# Patient Record
Sex: MALE | URBAN - METROPOLITAN AREA
[De-identification: names, ages, dates, MRNs, and addresses within clinical notes are randomized per-mention and may not be internally consistent; named-entity substitution may affect disease eponyms.]

---

## 2020-02-20 ENCOUNTER — RECORDS - HEALTHEAST (OUTPATIENT)
Dept: LAB | Facility: HOSPITAL | Age: 25
End: 2020-02-20

## 2020-02-21 LAB
HBV SURFACE AB SERPL IA-ACNC: POSITIVE M[IU]/ML
VZV IGG SER QL IA: POSITIVE

## 2020-02-24 LAB
GAMMA INTERFERON BACKGROUND BLD IA-ACNC: 0.03 IU/ML
M TB IFN-G BLD-IMP: NEGATIVE
MITOGEN IGNF BCKGRD COR BLD-ACNC: 0 IU/ML
MITOGEN IGNF BCKGRD COR BLD-ACNC: 0.01 IU/ML
QTF INTERPRETATION: NORMAL
QTF MITOGEN - NIL: 5.81 IU/ML

## 2020-04-06 ENCOUNTER — NURSE TRIAGE (OUTPATIENT)
Dept: NURSING | Facility: CLINIC | Age: 25
End: 2020-04-06

## 2020-04-06 NOTE — TELEPHONE ENCOUNTER
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 I referred him to employee health. Gave him the phone number and told him to have the employee health nurse paged and she/he will call him back with instructions.     Luz Maria Russell RN on 4/6/2020 at 3:43 PM    Reason for Disposition    General information question, no triage required and triager able to answer question    Additional Information    Negative: RN needs further essential information from caller in order to complete triage    Negative: Requesting regular office appointment    Negative: [1] Caller requesting NON-URGENT health information AND [2] PCP's office is the best resource    Protocols used: INFORMATION ONLY CALL-A-

## 2020-10-22 ENCOUNTER — APPOINTMENT (OUTPATIENT)
Dept: FAMILY MEDICINE | Facility: CLINIC | Age: 25
End: 2020-10-22
Payer: COMMERCIAL

## 2020-10-22 ENCOUNTER — RESULTS ONLY (OUTPATIENT)
Dept: LAB | Age: 25
End: 2020-10-22

## 2020-10-23 LAB
SARS-COV-2 RNA SPEC QL NAA+PROBE: NOT DETECTED
SPECIMEN SOURCE: NORMAL

## 2020-11-05 ENCOUNTER — HOSPITAL ENCOUNTER (EMERGENCY)
Facility: CLINIC | Age: 25
Discharge: HOME | End: 2020-11-05
Attending: PHYSICIAN ASSISTANT | Admitting: PHYSICIAN ASSISTANT
Payer: COMMERCIAL

## 2020-11-05 ENCOUNTER — APPOINTMENT (OUTPATIENT)
Dept: CT IMAGING | Facility: CLINIC | Age: 25
End: 2020-11-05
Attending: PHYSICIAN ASSISTANT
Payer: COMMERCIAL

## 2020-11-05 VITALS
TEMPERATURE: 98.9 F | BODY MASS INDEX: 26.32 KG/M2 | HEART RATE: 77 BPM | WEIGHT: 183.86 LBS | RESPIRATION RATE: 17 BRPM | SYSTOLIC BLOOD PRESSURE: 128 MMHG | DIASTOLIC BLOOD PRESSURE: 74 MMHG | OXYGEN SATURATION: 97 % | HEIGHT: 70 IN

## 2020-11-05 DIAGNOSIS — M54.41 ACUTE BILATERAL LOW BACK PAIN WITH BILATERAL SCIATICA: ICD-10-CM

## 2020-11-05 DIAGNOSIS — M54.42 ACUTE BILATERAL LOW BACK PAIN WITH BILATERAL SCIATICA: ICD-10-CM

## 2020-11-05 DIAGNOSIS — R51.9 HEADACHE: ICD-10-CM

## 2020-11-05 PROCEDURE — 250N000011 HC RX IP 250 OP 636: Performed by: PHYSICIAN ASSISTANT

## 2020-11-05 PROCEDURE — 99285 EMERGENCY DEPT VISIT HI MDM: CPT | Mod: 25

## 2020-11-05 PROCEDURE — 258N000003 HC RX IP 258 OP 636: Performed by: PHYSICIAN ASSISTANT

## 2020-11-05 PROCEDURE — 96375 TX/PRO/DX INJ NEW DRUG ADDON: CPT

## 2020-11-05 PROCEDURE — 96374 THER/PROPH/DIAG INJ IV PUSH: CPT

## 2020-11-05 PROCEDURE — 96361 HYDRATE IV INFUSION ADD-ON: CPT

## 2020-11-05 PROCEDURE — C9803 HOPD COVID-19 SPEC COLLECT: HCPCS

## 2020-11-05 PROCEDURE — 70450 CT HEAD/BRAIN W/O DYE: CPT

## 2020-11-05 PROCEDURE — U0003 INFECTIOUS AGENT DETECTION BY NUCLEIC ACID (DNA OR RNA); SEVERE ACUTE RESPIRATORY SYNDROME CORONAVIRUS 2 (SARS-COV-2) (CORONAVIRUS DISEASE [COVID-19]), AMPLIFIED PROBE TECHNIQUE, MAKING USE OF HIGH THROUGHPUT TECHNOLOGIES AS DESCRIBED BY CMS-2020-01-R: HCPCS | Performed by: PHYSICIAN ASSISTANT

## 2020-11-05 RX ORDER — KETOROLAC TROMETHAMINE 30 MG/ML
30 INJECTION, SOLUTION INTRAMUSCULAR; INTRAVENOUS ONCE
Status: COMPLETED | OUTPATIENT
Start: 2020-11-05 | End: 2020-11-05

## 2020-11-05 RX ORDER — METOCLOPRAMIDE 10 MG/1
10 TABLET ORAL 4 TIMES DAILY PRN
Qty: 10 TABLET | Refills: 0 | Status: SHIPPED | OUTPATIENT
Start: 2020-11-05

## 2020-11-05 RX ORDER — DEXAMETHASONE SODIUM PHOSPHATE 10 MG/ML
10 INJECTION, SOLUTION INTRAMUSCULAR; INTRAVENOUS ONCE
Status: COMPLETED | OUTPATIENT
Start: 2020-11-05 | End: 2020-11-05

## 2020-11-05 RX ORDER — DIPHENHYDRAMINE HCL 50 MG
50 CAPSULE ORAL EVERY 6 HOURS PRN
Qty: 10 CAPSULE | Refills: 0 | Status: SHIPPED | OUTPATIENT
Start: 2020-11-05

## 2020-11-05 RX ORDER — SODIUM CHLORIDE 9 MG/ML
INJECTION, SOLUTION INTRAVENOUS CONTINUOUS
Status: DISCONTINUED | OUTPATIENT
Start: 2020-11-05 | End: 2020-11-05 | Stop reason: HOSPADM

## 2020-11-05 RX ORDER — DIPHENHYDRAMINE HYDROCHLORIDE 50 MG/ML
25 INJECTION INTRAMUSCULAR; INTRAVENOUS ONCE
Status: COMPLETED | OUTPATIENT
Start: 2020-11-05 | End: 2020-11-05

## 2020-11-05 RX ORDER — CYCLOBENZAPRINE HCL 10 MG
10 TABLET ORAL 3 TIMES DAILY PRN
Qty: 20 TABLET | Refills: 0 | Status: SHIPPED | OUTPATIENT
Start: 2020-11-05 | End: 2020-11-11

## 2020-11-05 RX ORDER — METOCLOPRAMIDE HYDROCHLORIDE 5 MG/ML
10 INJECTION INTRAMUSCULAR; INTRAVENOUS ONCE
Status: COMPLETED | OUTPATIENT
Start: 2020-11-05 | End: 2020-11-05

## 2020-11-05 RX ADMIN — DIPHENHYDRAMINE HYDROCHLORIDE 25 MG: 50 INJECTION, SOLUTION INTRAMUSCULAR; INTRAVENOUS at 12:22

## 2020-11-05 RX ADMIN — METOCLOPRAMIDE HYDROCHLORIDE 10 MG: 5 INJECTION INTRAMUSCULAR; INTRAVENOUS at 12:24

## 2020-11-05 RX ADMIN — DEXAMETHASONE SODIUM PHOSPHATE 10 MG: 10 INJECTION, SOLUTION INTRAMUSCULAR; INTRAVENOUS at 12:21

## 2020-11-05 RX ADMIN — KETOROLAC TROMETHAMINE 30 MG: 30 INJECTION, SOLUTION INTRAMUSCULAR at 12:23

## 2020-11-05 RX ADMIN — SODIUM CHLORIDE 1000 ML: 9 INJECTION, SOLUTION INTRAVENOUS at 12:21

## 2020-11-05 ASSESSMENT — ENCOUNTER SYMPTOMS
FEVER: 0
HEMATURIA: 0
VOMITING: 0
HEADACHES: 1
WEAKNESS: 0
COUGH: 0
BACK PAIN: 1
DIARRHEA: 0
FREQUENCY: 0
MYALGIAS: 1
SHORTNESS OF BREATH: 0
NAUSEA: 0
NUMBNESS: 0
ABDOMINAL PAIN: 0
CHILLS: 0
SPEECH DIFFICULTY: 0
DYSURIA: 0
NECK STIFFNESS: 0

## 2020-11-05 ASSESSMENT — MIFFLIN-ST. JEOR: SCORE: 1820.25

## 2020-11-05 NOTE — ED AVS SNAPSHOT
[facility] Emergency Dept  [address]  [facility] [address]  Phone: [phone]  Fax: [phone]                                    Maged Lambert   MRN: [id number]    Department: [facility] Emergency Dept   Date of Visit: 11/5/2020           After Visit Summary Signature Page    I have received my discharge instructions, and my questions have been answered. I have discussed any challenges I see with this plan with the nurse or doctor.    ..........................................................................................................................................  Patient/Patient Representative Signature      ..........................................................................................................................................  Patient Representative Print Name and Relationship to Patient    ..................................................               ................................................  Date                                   Time    ..........................................................................................................................................  Reviewed by Signature/Title    ...................................................              ..............................................  Date                                               Time          22EPIC Rev 08/18

## 2020-11-05 NOTE — ED TRIAGE NOTES
Presents with lower back pain which radiates down bilateral legs to his calfs, nausea and headache since Tuesday. A&o x 4 with VSS on RA.

## 2020-11-05 NOTE — ED PROVIDER NOTES
History     Chief Complaint:  Back Pain and Headache      HPI   Maged Lambert is a 25 year old male who presents for evaluation of back pain and a headache. On 11/3/2020 the patient experienced the gradual onset of a throbbing headache as well as new aching back pain with radiation into his bilateral calves. Since onset his pain has been progressively worsening and he has been taking Tylenol at home with limited improvement of his pain. This morning his headache and back pain worsened significantly, prompting him to seek evaluation in the ED. He has not had any recent falls or head injuries. He last took Tylenol around 1000 this morning. He reports that he has previously had similar headaches but his headache today is more severe than what he generally experiences. Otherwise, he denies any recent fever, chills, cough, shortness of breath, chest pain, abdominal pain, nausea, vomiting, diarrhea, dysuria, hematuria, urinary frequency, neck stiffness, numbness, weakness, visual disturbance, speech changes, or incontinence of bowel or bladder. 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      Allergies:  No Known Drug Allergies     Medications:    Adderall  Monodox     Past Medical History:    The patient does not have any pertinent past medical history.     Past Surgical History:    History reviewed. No pertinent surgical history.     Family History:    History reviewed. No pertinent family history.      Social History:  The patient presented to the ED alone.   Smoking Status: Never smoker  Smokeless Tobacco: Never used  Alcohol Use: Yes  Marital Status:      Review of Systems   Constitutional: Negative for chills and fever.   Eyes: Negative for visual disturbance.   Respiratory: Negative for cough and shortness of breath.    Cardiovascular: Negative for chest pain.   Gastrointestinal: Negative for abdominal pain, diarrhea, nausea and vomiting.   Genitourinary: Negative for  "dysuria, frequency and hematuria.   Musculoskeletal: Positive for back pain and myalgias (leg pain). Negative for neck stiffness.   Neurological: Positive for headaches. Negative for speech difficulty, weakness and numbness.        (-) Incontinence    All other systems reviewed and are negative.    Physical Exam     Patient Vitals for the past 24 hrs:   BP Temp Temp src Pulse Resp SpO2 Height Weight   11/05/20 1245 -- -- -- -- -- 97 % -- --   11/05/20 1230 128/74 -- -- 77 -- 98 % -- --   11/05/20 1215 -- -- -- -- -- 100 % -- --   11/05/20 1122 132/67 98.9  F (37.2  C) Temporal 83 17 98 % 1.778 m (5' 10\") 83.4 kg (183 lb 13.8 oz)       Physical Exam  Vitals signs and nursing note reviewed.   Constitutional:       General: He is not in acute distress.     Appearance: He is not diaphoretic.   HENT:      Head: Normocephalic and atraumatic.   Eyes:      General: No scleral icterus.  Neck:      Meningeal: Brudzinski's sign and Kernig's sign absent.      Comments: No tenderness, stiff, or pain on examination  Cardiovascular:      Rate and Rhythm: Normal rate and regular rhythm.      Pulses: Normal pulses.   Pulmonary:      Effort: Pulmonary effort is normal. No respiratory distress.   Musculoskeletal:         General: No tenderness.      Comments: Baseline ROM, strength, sensation at the lumbar spine, bilateral hips, knee, ankle, feet, great toe through all cardinal motion   Skin:     General: Skin is warm.      Findings: No rash.   Neurological:      General: No focal deficit present.      Mental Status: He is alert and oriented to person, place, and time. Mental status is at baseline.      Cranial Nerves: No cranial nerve deficit.      Comments: CN intact, motor intact, sensory intact, no pronator drift, no gait disturbance, no deficits finger-finger-nose or heel-shin testing       Emergency Department Course     Imaging:  Radiology findings were communicated with the patient who voiced understanding of the " findings.    Head CT w/o Contrast:  IMPRESSION: No acute intracranial abnormality.   Per radiology.     Laboratory:  Asymptomatic COVID-19 Virus by PCR: Pending      Interventions:  1221 NS 1,000 mL IV   1221 Decadron 10 mg IV   1222 Benadryl 25 mg IV   1223 Toradol 30 mg IV   1224 Reglan 10 mg IV     Emergency Department Course:  Nursing notes and vitals reviewed.  1130: I performed an exam of the patient as documented above.     1250: I updated and reassessed the patient.     Findings and plan explained to the Patient. Patient discharged home with instructions regarding supportive care, medications, and reasons to return. The importance of close follow-up was reviewed. The patient was prescribed Flexeril, Benadryl, and Reglan.      Impression & Plan     Medical Decision Making:  He presents for evaluation of both headache and lower back pain. His back pain appears most likely to be muscular in nature though there is consideration for sciatica due to the distribution of his symptoms. He is without recent trauma and has no concerning medical history to warrant imaging for evaluation of compression fracture, burst fracture, spondylolysis, spinal epidural hematoma. He is without risk factors and clinically does not appear most c/w discitis, spinal epidural abscess. Denies symptoms of cauda equina syndrome. He is without symptoms of UTI or kidney stone. Clinically unlikely to be aortic dissection.   He doesn't appear most c/w meningitis or encephalitis. He voices having a headache some some days however this morning having most severe headache he's experienced. He appears within the window to perform CT imaging for evaluation of subarachnoid hemorrhage. This was performed and unremarkable.  He appears a candidate for discharge, outpatient care discussed, all questions answered.       Covid-19  Maged DONI Lambert was evaluated during a global COVID-19 pandemic, which necessitated consideration that the patient might be at  risk for infection with the SARS-CoV-2 virus that causes COVID-19.   Applicable protocols for evaluation were followed during the patient's care.   COVID-19 was considered as part of the patient's evaluation. The plan for testing is:  a test was obtained during this visit.     Diagnosis:    ICD-10-CM   1. Headache  R51.9   2. Acute bilateral low back pain with bilateral sciatica  M54.42    M54.41      Disposition:  Discharged to home.    Discharge Medications:  New Prescriptions    CYCLOBENZAPRINE (FLEXERIL) 10 MG TABLET    Take 1 tablet (10 mg) by mouth 3 times daily as needed for muscle spasms    DIPHENHYDRAMINE (BENADRYL) 50 MG CAPSULE    Take 1 capsule (50 mg) by mouth every 6 hours as needed (to be taken concurrently with Reglan for headache)    METOCLOPRAMIDE (REGLAN) 10 MG TABLET    Take 1 tablet (10 mg) by mouth 4 times daily as needed (headache)         Alberto ERNANDEZ, am serving as a scribe at 11:39 AM on 11/5/2020 to document services personally performed by Elie Baxter PA-C, based on my observations and the provider's statements to me.     [facility] EMERGENCY DEPT       Elie Baxter PA-C  11/05/20 2363

## 2020-11-06 LAB
SARS-COV-2 RNA SPEC QL NAA+PROBE: NOT DETECTED
SPECIMEN SOURCE: NORMAL

## 2021-11-09 ENCOUNTER — HOSPITAL ENCOUNTER (EMERGENCY)
Facility: CLINIC | Age: 26
Discharge: HOME | End: 2021-11-09
Attending: EMERGENCY MEDICINE | Admitting: EMERGENCY MEDICINE

## 2021-11-09 ENCOUNTER — APPOINTMENT (OUTPATIENT)
Dept: CT IMAGING | Facility: CLINIC | Age: 26
End: 2021-11-09
Attending: EMERGENCY MEDICINE

## 2021-11-09 VITALS
TEMPERATURE: 98.3 F | BODY MASS INDEX: 23.68 KG/M2 | OXYGEN SATURATION: 100 % | HEART RATE: 75 BPM | WEIGHT: 165 LBS | DIASTOLIC BLOOD PRESSURE: 65 MMHG | SYSTOLIC BLOOD PRESSURE: 120 MMHG | RESPIRATION RATE: 18 BRPM

## 2021-11-09 DIAGNOSIS — R51.9 NONINTRACTABLE HEADACHE, UNSPECIFIED CHRONICITY PATTERN, UNSPECIFIED HEADACHE TYPE: ICD-10-CM

## 2021-11-09 LAB
ANION GAP SERPL CALCULATED.3IONS-SCNC: 5 MMOL/L (ref 3–14)
BASOPHILS # BLD AUTO: 0 10E3/UL (ref 0–0.2)
BASOPHILS NFR BLD AUTO: 0 %
BUN SERPL-MCNC: 9 MG/DL (ref 7–30)
CALCIUM SERPL-MCNC: 8.9 MG/DL (ref 8.5–10.1)
CHLORIDE BLD-SCNC: 105 MMOL/L (ref 94–109)
CO2 SERPL-SCNC: 29 MMOL/L (ref 20–32)
CREAT SERPL-MCNC: 1.04 MG/DL (ref 0.66–1.25)
EOSINOPHIL # BLD AUTO: 0 10E3/UL (ref 0–0.7)
EOSINOPHIL NFR BLD AUTO: 0 %
ERYTHROCYTE [DISTWIDTH] IN BLOOD BY AUTOMATED COUNT: 12.4 % (ref 10–15)
FLUAV RNA SPEC QL NAA+PROBE: NEGATIVE
FLUBV RNA RESP QL NAA+PROBE: NEGATIVE
GFR SERPL CREATININE-BSD FRML MDRD: >90 ML/MIN/1.73M2
GLUCOSE BLD-MCNC: 96 MG/DL (ref 70–99)
HCT VFR BLD AUTO: 44.1 % (ref 40–53)
HGB BLD-MCNC: 14.5 G/DL (ref 13.3–17.7)
IMM GRANULOCYTES # BLD: 0 10E3/UL
IMM GRANULOCYTES NFR BLD: 0 %
LYMPHOCYTES # BLD AUTO: 1.3 10E3/UL (ref 0.8–5.3)
LYMPHOCYTES NFR BLD AUTO: 14 %
MCH RBC QN AUTO: 31.3 PG (ref 26.5–33)
MCHC RBC AUTO-ENTMCNC: 32.9 G/DL (ref 31.5–36.5)
MCV RBC AUTO: 95 FL (ref 78–100)
MONOCYTES # BLD AUTO: 0.7 10E3/UL (ref 0–1.3)
MONOCYTES NFR BLD AUTO: 7 %
NEUTROPHILS # BLD AUTO: 7.4 10E3/UL (ref 1.6–8.3)
NEUTROPHILS NFR BLD AUTO: 79 %
NRBC # BLD AUTO: 0 10E3/UL
NRBC BLD AUTO-RTO: 0 /100
PLATELET # BLD AUTO: 246 10E3/UL (ref 150–450)
POTASSIUM BLD-SCNC: 3.8 MMOL/L (ref 3.4–5.3)
RBC # BLD AUTO: 4.63 10E6/UL (ref 4.4–5.9)
SARS-COV-2 RNA RESP QL NAA+PROBE: NEGATIVE
SODIUM SERPL-SCNC: 139 MMOL/L (ref 133–144)
WBC # BLD AUTO: 9.4 10E3/UL (ref 4–11)

## 2021-11-09 PROCEDURE — 96375 TX/PRO/DX INJ NEW DRUG ADDON: CPT

## 2021-11-09 PROCEDURE — 99285 EMERGENCY DEPT VISIT HI MDM: CPT | Mod: 25

## 2021-11-09 PROCEDURE — 96374 THER/PROPH/DIAG INJ IV PUSH: CPT | Mod: 59

## 2021-11-09 PROCEDURE — 36415 COLL VENOUS BLD VENIPUNCTURE: CPT | Performed by: EMERGENCY MEDICINE

## 2021-11-09 PROCEDURE — 258N000003 HC RX IP 258 OP 636: Performed by: EMERGENCY MEDICINE

## 2021-11-09 PROCEDURE — 87636 SARSCOV2 & INF A&B AMP PRB: CPT | Performed by: EMERGENCY MEDICINE

## 2021-11-09 PROCEDURE — 250N000011 HC RX IP 250 OP 636: Performed by: EMERGENCY MEDICINE

## 2021-11-09 PROCEDURE — C9803 HOPD COVID-19 SPEC COLLECT: HCPCS

## 2021-11-09 PROCEDURE — 85025 COMPLETE CBC W/AUTO DIFF WBC: CPT | Performed by: EMERGENCY MEDICINE

## 2021-11-09 PROCEDURE — 96361 HYDRATE IV INFUSION ADD-ON: CPT

## 2021-11-09 PROCEDURE — 80048 BASIC METABOLIC PNL TOTAL CA: CPT | Performed by: EMERGENCY MEDICINE

## 2021-11-09 PROCEDURE — 70450 CT HEAD/BRAIN W/O DYE: CPT

## 2021-11-09 PROCEDURE — 70496 CT ANGIOGRAPHY HEAD: CPT

## 2021-11-09 RX ORDER — DIPHENHYDRAMINE HYDROCHLORIDE 50 MG/ML
25 INJECTION INTRAMUSCULAR; INTRAVENOUS ONCE
Status: COMPLETED | OUTPATIENT
Start: 2021-11-09 | End: 2021-11-09

## 2021-11-09 RX ORDER — KETOROLAC TROMETHAMINE 15 MG/ML
15 INJECTION, SOLUTION INTRAMUSCULAR; INTRAVENOUS ONCE
Status: COMPLETED | OUTPATIENT
Start: 2021-11-09 | End: 2021-11-09

## 2021-11-09 RX ORDER — IOPAMIDOL 755 MG/ML
70 INJECTION, SOLUTION INTRAVASCULAR ONCE
Status: COMPLETED | OUTPATIENT
Start: 2021-11-09 | End: 2021-11-09

## 2021-11-09 RX ORDER — DEXAMETHASONE SODIUM PHOSPHATE 10 MG/ML
10 INJECTION, SOLUTION INTRAMUSCULAR; INTRAVENOUS ONCE
Status: COMPLETED | OUTPATIENT
Start: 2021-11-09 | End: 2021-11-09

## 2021-11-09 RX ORDER — METOCLOPRAMIDE HYDROCHLORIDE 5 MG/ML
5 INJECTION INTRAMUSCULAR; INTRAVENOUS ONCE
Status: COMPLETED | OUTPATIENT
Start: 2021-11-09 | End: 2021-11-09

## 2021-11-09 RX ADMIN — SODIUM CHLORIDE 1000 ML: 9 INJECTION, SOLUTION INTRAVENOUS at 09:21

## 2021-11-09 RX ADMIN — METOCLOPRAMIDE HYDROCHLORIDE 5 MG: 5 INJECTION INTRAMUSCULAR; INTRAVENOUS at 09:22

## 2021-11-09 RX ADMIN — IOPAMIDOL 70 ML: 755 INJECTION, SOLUTION INTRAVENOUS at 10:04

## 2021-11-09 RX ADMIN — DEXAMETHASONE SODIUM PHOSPHATE 10 MG: 10 INJECTION INTRAMUSCULAR; INTRAVENOUS at 11:23

## 2021-11-09 RX ADMIN — KETOROLAC TROMETHAMINE 15 MG: 15 INJECTION, SOLUTION INTRAMUSCULAR; INTRAVENOUS at 09:23

## 2021-11-09 RX ADMIN — DIPHENHYDRAMINE HYDROCHLORIDE 25 MG: 50 INJECTION INTRAMUSCULAR; INTRAVENOUS at 09:24

## 2021-11-09 ASSESSMENT — ENCOUNTER SYMPTOMS
NUMBNESS: 0
VOMITING: 1
FEVER: 0
PHOTOPHOBIA: 0
NAUSEA: 1
HEADACHES: 1
WEAKNESS: 0
CHILLS: 0

## 2021-11-09 NOTE — DISCHARGE INSTRUCTIONS
Please monitor your symptoms closely.  Be sure to drink lots of fluids to ensure adequate hydration.  You may use Tylenol or ibuprofen as needed for headache.  Please follow-up with a primary care provider.  An order has been placed to help you set up a follow-up appointment.    Please continue with isolation until your Covid test results have returned.    Return to the ER if you develop worsening headache, vomiting, vision changes, numbness/weakness of your arms or legs, or any other new or troubling symptoms.    Discharge Instructions  Headache    You were seen today for a headache. Headaches may be caused by many different things such as muscle tension, sinus inflammation, anxiety and stress, having too little sleep, too much alcohol, some medical conditions or injury. You may have a migraine, which is caused by changes in the blood vessels in your head.  At this time your provider does not find that your headache is a sign of anything dangerous or life-threatening.  However, sometimes the signs of serious illness do not show up right away.      Generally, every Emergency Department visit should have a follow-up clinic visit with either a primary or a specialty clinic/provider. Please follow-up as instructed by your emergency provider today.    Return to the Emergency Department if:  You get a new fever of 100.4 F or higher.  Your headache gets much worse.  You get a stiff neck with your headache.  You get a new headache that is significantly different or worse than headaches you have had before.  You are vomiting (throwing up) and cannot keep food or water down.  You have blurry or double vision or other problems with your eyes.  You have a new weakness on one side of your body.  You have difficulty with balance which is new.  You or your family thinks you are confused.  You have a seizure.    What can I do to help myself?  Pain medications - You may take a pain medication such as Tylenol  (acetaminophen),  Advil , Motrin  (ibuprofen) or Aleve  (naproxen).  Take a pain reliever as soon as you notice symptoms.  Starting medications as soon as you start to have symptoms may lessen the amount of pain you have.  Relaxing in a quiet, dark room may help.  Get enough sleep and eat meals regularly.  You may need to watch for certain foods or other things which may trigger your headaches.  Keeping a journal of your headaches and possible triggers may help you and your primary provider to identify things which you should avoid which may be causing your headaches.  If you were given a prescription for medicine here today, be sure to read all of the information (including the package insert) that comes with your prescription.  This will include important information about the medicine, its side effects, and any warnings that you need to know about.  The pharmacist who fills the prescription can provide more information and answer questions you may have about the medicine.  If you have questions or concerns that the pharmacist cannot address, please call or return to the Emergency Department.   Remember that you can always come back to the Emergency Department if you are not able to see your regular provider in the amount of time listed above, if you get any new symptoms, or if there is anything that worries you.

## 2021-11-09 NOTE — ED TRIAGE NOTES
A&O x4, ABCs intact. Pt presents with headache for the past 3 days. Pt states that the headache is on the top of his head and when he moves it radiates down into his neck bilaterally. Pt taking tylenol and ibuprofen without relief.

## 2021-11-09 NOTE — ED PROVIDER NOTES
History   Chief Complaint:  Headache    The history is provided by the patient.      Maged Lambert is a 26 year old male, otherwise healthy, who presents with pain to both the anterior and posterior aspect of his head, with gradual onset 2 days ago. The patient reports worsening symptoms with movement, noting a severity of 9/10 with this, which reduces to its minimum of 7/10 when resting. He reports the pain as constant since its onset, noting that he has awoken up with the pain, and it has not gone away. He reports nausea and a single episode of emesis last night due to pain. The patient has been taking Tylenol, ibuprofen, and Excedrin, in addition to showering and using cold packs, none of which have offered relief. He last took medication for pain management last night. Persisting pain at a current 7/10 prompted his presentation to the ED at this time. Of note, the patient reports a history of similar headache 1 year ago, at which point he was seen in the ED but notes that his symptoms were less severe at that time than they are currently. He is not sure of the cause of his headache then and is equally unsure of its cause currently. His symptoms are not exacerbated by light or loud noises. He has had no recent falls or injuries and is otherwise healthy, notably taking no regular medication and having no allergies to medication. He denies any family history of migraines, aneurysm, or stroke. Of note, the patient works as a paramedic. He rarely drinks alcohol. He denies any recent vision changes, numbness or weakness, fever, or chills. He reports no recent diet changes.    Review of Systems   Constitutional: Negative for chills and fever.   Eyes: Negative for photophobia and visual disturbance.   Gastrointestinal: Positive for nausea and vomiting.   Neurological: Positive for headaches. Negative for weakness and numbness.   All other systems reviewed and are negative.    Allergies:  No Known  Allergies    Medications:  The patient denies taking any medication regularly.    Past Medical History:     Acute appendicitis      Past Surgical History:    Appendectomy  Gravel removal    Social History:  The patient presented with a relative.  The patient arrived in a private vehicle.    Physical Exam     Patient Vitals for the past 24 hrs:   BP Temp Temp src Pulse Resp SpO2 Weight   11/09/21 1345 -- -- -- -- -- 100 % --   11/09/21 1230 -- -- -- -- -- 100 % --   11/09/21 1130 120/65 -- -- -- -- 100 % --   11/09/21 0945 117/71 -- -- 75 -- 100 % --   11/09/21 0930 120/73 -- -- 76 -- 100 % --   11/09/21 0915 124/80 -- -- 84 -- 99 % --   11/09/21 0800 126/74 98.3  F (36.8  C) Oral 97 18 100 % 74.8 kg (165 lb)     Physical Exam    General:              Well-nourished              Speaking in full sentences  Eyes:              Conjunctiva without injection or scleral icterus  ENT:              Moist mucous membranes              Palpation along temporal region helps alleviate discomfort              Nares patent              Pinnae normal  Neck:              Full ROM              Tenderness and tightness in cervical paraspinal / trapezius musculature  Resp:              Lungs CTAB              No crackles, wheezing or audible rubs              Good air movement  CV:                    Normal rate, regular rhythm              S1 and S2 present              No murmur, gallop or rub  GI:              BS present              Abdomen soft without distention              Non-tender to light and deep palpation              No guarding or rebound tenderness  Skin:              Warm, dry, well perfused              No rashes or open wounds on exposed skin  MSK:              Moves all extremities              No focal deformities or swelling  Neuro:              Alert              CN III-XII intact              5/5  strength              5/5 ankle dorsi/plantarflexion              SILT in BUE and BLE              2+ biceps  and patellar reflexes              Answers questions appropriately              Moves all extremities equally              Gait stable  Psych:              Normal affect, normal mood    Emergency Department Course     Imaging:  CT Head Neck Angio w/o & w Contrast   Final Result   IMPRESSION: Normal neck and head CTA.         Radiation dose for this scan was reduced using automated exposure   control, adjustment of the mA and/or kV according to patient size, or   iterative reconstruction technique.      SNEHAL FIGUEROA MD            SYSTEM ID:  [id number]      CT Head w/o Contrast   Final Result   IMPRESSION:  Normal head CT.         Radiation dose for this scan was reduced using automated exposure   control, adjustment of the mA and/or kV according to patient size, or   iterative reconstruction technique      SNEHAL FIGUEROA MD            SYSTEM ID:  [id number]        Report per radiology    Laboratory:  Labs Ordered and Resulted from Time of ED Arrival to Time of ED Departure   BASIC METABOLIC PANEL - Normal       Result Value    Sodium 139      Potassium 3.8      Chloride 105      Carbon Dioxide (CO2) 29      Anion Gap 5      Urea Nitrogen 9      Creatinine 1.04      Calcium 8.9      Glucose 96      GFR Estimate >90     INFLUENZA A/B & SARS-COV2 PCR MULTIPLEX - Normal    Influenza A target Negative      Influenza B target Negative      SARS CoV2 PCR Negative     CBC WITH PLATELETS AND DIFFERENTIAL    WBC Count 9.4      RBC Count 4.63      Hemoglobin 14.5      Hematocrit 44.1      MCV 95      MCH 31.3      MCHC 32.9      RDW 12.4      Platelet Count 246      % Neutrophils 79      % Lymphocytes 14      % Monocytes 7      % Eosinophils 0      % Basophils 0      % Immature Granulocytes 0      NRBCs per 100 WBC 0      Absolute Neutrophils 7.4      Absolute Lymphocytes 1.3      Absolute Monocytes 0.7      Absolute Eosinophils 0.0      Absolute Basophils 0.0      Absolute Immature Granulocytes 0.0      Absolute NRBCs 0.0        Emergency Department Course:    Reviewed:  I reviewed nursing notes, vitals, past medical history and Care Everywhere.    Assessments:  0852 I obtained history and examined the patient as noted above.   1110 I rechecked the patient. He is still experiencing a headache.   1338 I rechecked the patient and explained findings. He is feeling improved and I believe that he is safe for discharge at this time.    Consults:  1346 I spoke with neuro-radiology.    Interventions:  0921 NS 1 L IV  0922 Reglan 5 mg IV  0923 Toradol 15 mg IV  0924 Benadryl 25 mg IV  1123 Decadron 10 mg IV    Disposition:  The patient was discharged to home.     Impression & Plan     Medical Decision Making:    Maged Lambert is a 26-year-old male presenting to the emergency department accompanied by his mother for evaluation of a headache.  VS on presentation are unremarkable, notable for the absence of fever.  Examination reveals a well-appearing male resting comfortably on the gurney, with a nonfocal neurologic exam. Differential diagnosis is broad, including but not limited to tension, migraine, cluster headache, subarachnoid hemorrhage, bacterial meningitis/encephalitis, temporal arteritis, tumor/mass, CO poisoning, subdural/epidural hematoma, and hypertensive emergency.  At present, the precise etiology for patient's symptomatology not entirely clear, though I do favor primary headache syndrome.  Patient describes pain about the temporal region, described as a bandlike distribution, with associated discomfort down the back of his neck.  His exam does reveal tightness to the muscles of the cervical paraspinal region as well as trapezius area and pain is improved with pressing against his bilateral temples.  Given his associated episode of emesis, I strongly considered other more ominous intracranial pathology.  His headache was gradual in onset, and not thunderclap in nature, which argues against subarachnoid hemorrhage.  CTA negative for  aneurysmal disease.  Additionally, no evidence of vascular dissection or acute vascular abnormality.  No evidence of space-occupying lesion, and neurologic exam is nonfocal.  History and presentation not suggestive of CVA/TIA.  No use of exogenous steroids, no prior history of prothrombotic disorder that would increase risk for cerebral venous thrombosis.  No evidence of vascular abnormality to suggest cerebral vasoconstriction syndrome.  Patient was provided the above supportive treatments and on reassessment, noting improvements in symptoms.  His neurologic exam remains nonfocal.  Rapid Covid test and influenza testing did return negative.  I feel this is unlikely to represent meningitis/encephalitis in the absence of fever, AMS, neck stiffness, and normal WBC count.  At present, do feel that lumbar puncture can be deferred safely.  With reasonable clinical certainty I feel that he can safely be discharged from the ER with supportive outpatient treatment and close follow-up.  An order was placed to assist with following up with a primary care provider for ongoing management and close follow-up.  Strict return precautions discussed including worsening headache, vomiting, development of focal neurologic deficits or any other concerns.  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  All of his questions have been answered prior to discharge.    Diagnosis:    ICD-10-CM    1. Nonintractable headache, unspecified chronicity pattern, unspecified headache type  R51.9 Primary Care Referral     Scribe Disclosure:  OMA, Misty Plasencia, am serving as a scribe at 8:51 AM on 11/9/2021 to document services personally performed by Ren Kirby MD based on my observations and the provider's statements to me.       Ren Kirby MD  11/09/21 3134

## 2022-01-30 ENCOUNTER — HEALTH MAINTENANCE LETTER (OUTPATIENT)
Age: 27
End: 2022-01-30

## 2022-09-24 ENCOUNTER — HEALTH MAINTENANCE LETTER (OUTPATIENT)
Age: 27
End: 2022-09-24

## 2023-05-08 ENCOUNTER — HEALTH MAINTENANCE LETTER (OUTPATIENT)
Age: 28
End: 2023-05-08

## 2024-03-17 ENCOUNTER — OFFICE VISIT (OUTPATIENT)
Dept: URGENT CARE | Facility: URGENT CARE | Age: 29
End: 2024-03-17
Payer: COMMERCIAL

## 2024-03-17 VITALS
HEART RATE: 65 BPM | WEIGHT: 180 LBS | HEIGHT: 70 IN | DIASTOLIC BLOOD PRESSURE: 69 MMHG | SYSTOLIC BLOOD PRESSURE: 110 MMHG | TEMPERATURE: 98.1 F | BODY MASS INDEX: 25.77 KG/M2 | OXYGEN SATURATION: 99 %

## 2024-03-17 DIAGNOSIS — R07.0 THROAT PAIN: Primary | ICD-10-CM

## 2024-03-17 LAB — DEPRECATED S PYO AG THROAT QL EIA: NEGATIVE

## 2024-03-17 PROCEDURE — 87651 STREP A DNA AMP PROBE: CPT | Performed by: FAMILY MEDICINE

## 2024-03-17 PROCEDURE — 99203 OFFICE O/P NEW LOW 30 MIN: CPT | Performed by: FAMILY MEDICINE

## 2024-03-17 NOTE — PROGRESS NOTES
"  ICD-10-CM    1. Throat pain  R07.0 Streptococcus A Rapid Screen w/Reflex to PCR - Clinic Collect     Group A Streptococcus PCR Throat Swab        RST negative.  PCR pending.  No evidence of peritonsillar abscess at this time.    PLAN:  Patient Instructions   Rapid strep test today is negative.  Your strep PCR confirmation test should be back by late afternoon tomorrow.    If confirmation test is positive for strep, clinic team will be in touch to start antibiotics.    If confirmation negative, test for COVID at home to rule that out.    Gargle with Chloraseptic spray to help with sore throat pain.    SUBJECTIVE:  Maged Lambert is a 28 year old male who presents to  today with 2-3 days of sore throat.  No known fever.  Wants to rule out strep.  No ear pain.  No rashes.  Has not tested for COVID at home.    OBJECTIVE:  /69   Pulse 65   Temp 98.1  F (36.7  C) (Tympanic)   Ht 1.778 m (5' 10\")   Wt 81.6 kg (180 lb)   SpO2 99%   BMI 25.83 kg/m    GEN: well-appearing, in NAD  ENT: TMs normal, oral MMM, pharynx mildly erythematous, uvula midline, no exudates noted  Neck: no LAD in anterior or posterior chains  Lungs:  CTAB, good air entry throughout    Results for orders placed or performed in visit on 03/17/24   Streptococcus A Rapid Screen w/Reflex to PCR - Clinic Collect     Status: Normal    Specimen: Throat; Swab   Result Value Ref Range    Group A Strep antigen Negative Negative       "

## 2024-03-17 NOTE — PATIENT INSTRUCTIONS
Rapid strep test today is negative.  Your strep PCR confirmation test should be back by late afternoon tomorrow.    If confirmation test is positive for strep, clinic team will be in touch to start antibiotics.    If confirmation negative, test for COVID at home to rule that out.    Gargle with Chloraseptic spray to help with sore throat pain.

## 2024-03-18 LAB — GROUP A STREP BY PCR: NOT DETECTED

## 2024-07-14 ENCOUNTER — HEALTH MAINTENANCE LETTER (OUTPATIENT)
Age: 29
End: 2024-07-14

## 2025-07-19 ENCOUNTER — HEALTH MAINTENANCE LETTER (OUTPATIENT)
Age: 30
End: 2025-07-19